# Patient Record
Sex: MALE | Race: BLACK OR AFRICAN AMERICAN | NOT HISPANIC OR LATINO | Employment: FULL TIME | URBAN - METROPOLITAN AREA
[De-identification: names, ages, dates, MRNs, and addresses within clinical notes are randomized per-mention and may not be internally consistent; named-entity substitution may affect disease eponyms.]

---

## 2021-04-16 ENCOUNTER — HOSPITAL ENCOUNTER (OUTPATIENT)
Dept: GENERAL RADIOLOGY | Facility: HOSPITAL | Age: 51
Discharge: HOME | End: 2021-04-16
Attending: FAMILY MEDICINE

## 2021-08-02 ENCOUNTER — TELEPHONE (OUTPATIENT)
Dept: UROLOGY | Facility: CLINIC | Age: 51
End: 2021-08-02

## 2022-01-14 PROCEDURE — U0004 COV-19 TEST NON-CDC HGH THRU: HCPCS | Performed by: EMERGENCY MEDICINE

## 2022-02-02 PROCEDURE — U0004 COV-19 TEST NON-CDC HGH THRU: HCPCS | Performed by: EMERGENCY MEDICINE

## 2023-04-27 ENCOUNTER — OFFICE VISIT (OUTPATIENT)
Dept: FAMILY MEDICINE CLINIC | Facility: CLINIC | Age: 53
End: 2023-04-27
Payer: COMMERCIAL

## 2023-04-27 VITALS
DIASTOLIC BLOOD PRESSURE: 82 MMHG | BODY MASS INDEX: 29.85 KG/M2 | HEIGHT: 67 IN | HEART RATE: 86 BPM | OXYGEN SATURATION: 97 % | SYSTOLIC BLOOD PRESSURE: 124 MMHG | WEIGHT: 190.2 LBS

## 2023-04-27 DIAGNOSIS — Z13.220 LIPID SCREENING: ICD-10-CM

## 2023-04-27 DIAGNOSIS — Z13.29 THYROID DISORDER SCREENING: ICD-10-CM

## 2023-04-27 DIAGNOSIS — Z12.11 SCREEN FOR COLON CANCER: ICD-10-CM

## 2023-04-27 DIAGNOSIS — Z13.1 DIABETES MELLITUS SCREENING: ICD-10-CM

## 2023-04-27 DIAGNOSIS — Z11.59 NEED FOR HEPATITIS C SCREENING TEST: ICD-10-CM

## 2023-04-27 DIAGNOSIS — I10 PRIMARY HYPERTENSION: Primary | ICD-10-CM

## 2023-04-27 LAB
ALBUMIN SERPL-MCNC: 4.5 G/DL (ref 3.5–5.2)
ALBUMIN/GLOB SERPL: 1.7 G/DL
ALP SERPL-CCNC: 64 U/L (ref 39–117)
ALT SERPL W P-5'-P-CCNC: 17 U/L (ref 1–41)
ANION GAP SERPL CALCULATED.3IONS-SCNC: 9 MMOL/L (ref 5–15)
AST SERPL-CCNC: 18 U/L (ref 1–40)
BASOPHILS # BLD AUTO: 0.04 10*3/MM3 (ref 0–0.2)
BASOPHILS NFR BLD AUTO: 0.5 % (ref 0–1.5)
BILIRUB SERPL-MCNC: 0.2 MG/DL (ref 0–1.2)
BUN SERPL-MCNC: 13 MG/DL (ref 6–20)
BUN/CREAT SERPL: 12.9 (ref 7–25)
CALCIUM SPEC-SCNC: 9.4 MG/DL (ref 8.6–10.5)
CHLORIDE SERPL-SCNC: 104 MMOL/L (ref 98–107)
CHOLEST SERPL-MCNC: 203 MG/DL (ref 0–200)
CO2 SERPL-SCNC: 30 MMOL/L (ref 22–29)
CREAT SERPL-MCNC: 1.01 MG/DL (ref 0.76–1.27)
DEPRECATED RDW RBC AUTO: 42.3 FL (ref 37–54)
EGFRCR SERPLBLD CKD-EPI 2021: 89.5 ML/MIN/1.73
EOSINOPHIL # BLD AUTO: 0.2 10*3/MM3 (ref 0–0.4)
EOSINOPHIL NFR BLD AUTO: 2.6 % (ref 0.3–6.2)
ERYTHROCYTE [DISTWIDTH] IN BLOOD BY AUTOMATED COUNT: 13.8 % (ref 12.3–15.4)
GLOBULIN UR ELPH-MCNC: 2.6 GM/DL
GLUCOSE SERPL-MCNC: 90 MG/DL (ref 65–99)
HCT VFR BLD AUTO: 39 % (ref 37.5–51)
HCV AB SER DONR QL: NORMAL
HDLC SERPL QL: 5.08
HDLC SERPL-MCNC: 40 MG/DL (ref 40–60)
HGB BLD-MCNC: 13.2 G/DL (ref 13–17.7)
IMM GRANULOCYTES # BLD AUTO: 0.02 10*3/MM3 (ref 0–0.05)
IMM GRANULOCYTES NFR BLD AUTO: 0.3 % (ref 0–0.5)
LDLC SERPL CALC-MCNC: 100 MG/DL (ref 0–100)
LYMPHOCYTES # BLD AUTO: 3.01 10*3/MM3 (ref 0.7–3.1)
LYMPHOCYTES NFR BLD AUTO: 38.4 % (ref 19.6–45.3)
MCH RBC QN AUTO: 28.9 PG (ref 26.6–33)
MCHC RBC AUTO-ENTMCNC: 33.8 G/DL (ref 31.5–35.7)
MCV RBC AUTO: 85.5 FL (ref 79–97)
MONOCYTES # BLD AUTO: 0.74 10*3/MM3 (ref 0.1–0.9)
MONOCYTES NFR BLD AUTO: 9.4 % (ref 5–12)
NEUTROPHILS NFR BLD AUTO: 3.83 10*3/MM3 (ref 1.7–7)
NEUTROPHILS NFR BLD AUTO: 48.8 % (ref 42.7–76)
NRBC BLD AUTO-RTO: 0.1 /100 WBC (ref 0–0.2)
PLATELET # BLD AUTO: 228 10*3/MM3 (ref 140–450)
PMV BLD AUTO: 12.8 FL (ref 6–12)
POTASSIUM SERPL-SCNC: 4.5 MMOL/L (ref 3.5–5.2)
PROT SERPL-MCNC: 7.1 G/DL (ref 6–8.5)
RBC # BLD AUTO: 4.56 10*6/MM3 (ref 4.14–5.8)
SODIUM SERPL-SCNC: 143 MMOL/L (ref 136–145)
T4 FREE SERPL-MCNC: 1.47 NG/DL (ref 0.93–1.7)
TRIGL SERPL-MCNC: 376 MG/DL (ref 0–150)
TSH SERPL DL<=0.05 MIU/L-ACNC: 1.11 UIU/ML (ref 0.27–4.2)
VLDLC SERPL-MCNC: 63 MG/DL (ref 5–40)
WBC NRBC COR # BLD: 7.84 10*3/MM3 (ref 3.4–10.8)

## 2023-04-27 PROCEDURE — 80061 LIPID PANEL: CPT | Performed by: NURSE PRACTITIONER

## 2023-04-27 PROCEDURE — 84439 ASSAY OF FREE THYROXINE: CPT | Performed by: NURSE PRACTITIONER

## 2023-04-27 PROCEDURE — 86803 HEPATITIS C AB TEST: CPT | Performed by: NURSE PRACTITIONER

## 2023-04-27 PROCEDURE — 80050 GENERAL HEALTH PANEL: CPT | Performed by: NURSE PRACTITIONER

## 2023-04-27 RX ORDER — METOPROLOL TARTRATE 100 MG/1
100 TABLET ORAL DAILY
Qty: 90 TABLET | Refills: 1 | Status: SHIPPED | OUTPATIENT
Start: 2023-04-27

## 2023-04-27 RX ORDER — MELOXICAM 7.5 MG/1
TABLET ORAL
COMMUNITY
Start: 2023-04-21 | End: 2023-04-27

## 2023-04-27 RX ORDER — BUSPIRONE HYDROCHLORIDE 10 MG/1
TABLET ORAL
COMMUNITY
Start: 2023-04-21

## 2023-05-01 ENCOUNTER — PATIENT ROUNDING (BHMG ONLY) (OUTPATIENT)
Dept: FAMILY MEDICINE CLINIC | Facility: CLINIC | Age: 53
End: 2023-05-01
Payer: COMMERCIAL

## 2023-05-26 ENCOUNTER — TELEPHONE (OUTPATIENT)
Dept: FAMILY MEDICINE CLINIC | Facility: CLINIC | Age: 53
End: 2023-05-26
Payer: COMMERCIAL

## 2023-10-20 DIAGNOSIS — I10 PRIMARY HYPERTENSION: ICD-10-CM

## 2023-10-20 RX ORDER — METOPROLOL TARTRATE 100 MG/1
100 TABLET ORAL DAILY
Qty: 90 TABLET | Refills: 1 | Status: SHIPPED | OUTPATIENT
Start: 2023-10-20

## 2024-04-14 DIAGNOSIS — I10 PRIMARY HYPERTENSION: ICD-10-CM

## 2024-04-15 DIAGNOSIS — I10 PRIMARY HYPERTENSION: ICD-10-CM

## 2024-04-15 RX ORDER — METOPROLOL TARTRATE 100 MG/1
100 TABLET ORAL DAILY
Qty: 30 TABLET | Refills: 0 | Status: SHIPPED | OUTPATIENT
Start: 2024-04-15

## 2024-04-15 RX ORDER — METOPROLOL TARTRATE 100 MG/1
100 TABLET ORAL DAILY
Qty: 90 TABLET | OUTPATIENT
Start: 2024-04-15

## 2024-05-16 DIAGNOSIS — I10 PRIMARY HYPERTENSION: ICD-10-CM

## 2024-05-16 RX ORDER — METOPROLOL TARTRATE 100 MG/1
100 TABLET ORAL DAILY
Qty: 30 TABLET | Refills: 0 | Status: SHIPPED | OUTPATIENT
Start: 2024-05-16

## 2024-07-17 DIAGNOSIS — I10 PRIMARY HYPERTENSION: ICD-10-CM

## 2024-07-17 RX ORDER — METOPROLOL TARTRATE 100 MG/1
100 TABLET ORAL DAILY
Qty: 90 TABLET | Refills: 1 | Status: SHIPPED | OUTPATIENT
Start: 2024-07-17

## 2025-01-15 DIAGNOSIS — I10 PRIMARY HYPERTENSION: ICD-10-CM

## 2025-01-15 RX ORDER — METOPROLOL TARTRATE 100 MG/1
100 TABLET ORAL DAILY
Qty: 90 TABLET | Refills: 1 | OUTPATIENT
Start: 2025-01-15

## 2025-01-28 ENCOUNTER — OFFICE VISIT (OUTPATIENT)
Dept: FAMILY MEDICINE CLINIC | Facility: CLINIC | Age: 55
End: 2025-01-28
Payer: COMMERCIAL

## 2025-01-28 VITALS
WEIGHT: 195 LBS | SYSTOLIC BLOOD PRESSURE: 129 MMHG | OXYGEN SATURATION: 98 % | DIASTOLIC BLOOD PRESSURE: 84 MMHG | HEIGHT: 67 IN | HEART RATE: 60 BPM | BODY MASS INDEX: 30.61 KG/M2

## 2025-01-28 DIAGNOSIS — F41.9 ANXIETY: ICD-10-CM

## 2025-01-28 DIAGNOSIS — Z87.891 PERSONAL HISTORY OF TOBACCO USE, PRESENTING HAZARDS TO HEALTH: ICD-10-CM

## 2025-01-28 DIAGNOSIS — Z13.220 LIPID SCREENING: ICD-10-CM

## 2025-01-28 DIAGNOSIS — B37.2 SKIN YEAST INFECTION: ICD-10-CM

## 2025-01-28 DIAGNOSIS — Z13.1 DIABETES MELLITUS SCREENING: ICD-10-CM

## 2025-01-28 DIAGNOSIS — Z13.29 THYROID DISORDER SCREENING: ICD-10-CM

## 2025-01-28 DIAGNOSIS — M70.11 BURSITIS OF RIGHT WRIST: ICD-10-CM

## 2025-01-28 DIAGNOSIS — I10 PRIMARY HYPERTENSION: Primary | ICD-10-CM

## 2025-01-28 LAB
ALBUMIN SERPL-MCNC: 4.2 G/DL (ref 3.5–5.2)
ALBUMIN/GLOB SERPL: 1.4 G/DL
ALP SERPL-CCNC: 67 U/L (ref 39–117)
ALT SERPL W P-5'-P-CCNC: 19 U/L (ref 1–41)
ANION GAP SERPL CALCULATED.3IONS-SCNC: 6 MMOL/L (ref 5–15)
AST SERPL-CCNC: 21 U/L (ref 1–40)
BILIRUB SERPL-MCNC: 0.4 MG/DL (ref 0–1.2)
BUN SERPL-MCNC: 11 MG/DL (ref 6–20)
BUN/CREAT SERPL: 9.3 (ref 7–25)
CALCIUM SPEC-SCNC: 9.4 MG/DL (ref 8.6–10.5)
CHLORIDE SERPL-SCNC: 106 MMOL/L (ref 98–107)
CHOLEST SERPL-MCNC: 220 MG/DL (ref 0–200)
CO2 SERPL-SCNC: 28 MMOL/L (ref 22–29)
CREAT SERPL-MCNC: 1.18 MG/DL (ref 0.76–1.27)
DEPRECATED RDW RBC AUTO: 44.6 FL (ref 37–54)
EGFRCR SERPLBLD CKD-EPI 2021: 73.3 ML/MIN/1.73
ERYTHROCYTE [DISTWIDTH] IN BLOOD BY AUTOMATED COUNT: 13.8 % (ref 12.3–15.4)
GLOBULIN UR ELPH-MCNC: 2.9 GM/DL
GLUCOSE SERPL-MCNC: 98 MG/DL (ref 65–99)
HCT VFR BLD AUTO: 39.8 % (ref 37.5–51)
HDLC SERPL-MCNC: 40 MG/DL (ref 40–60)
HGB BLD-MCNC: 13.3 G/DL (ref 13–17.7)
LDLC SERPL CALC-MCNC: 129 MG/DL (ref 0–100)
LDLC/HDLC SERPL: 3.07 {RATIO}
MCH RBC QN AUTO: 29.4 PG (ref 26.6–33)
MCHC RBC AUTO-ENTMCNC: 33.4 G/DL (ref 31.5–35.7)
MCV RBC AUTO: 87.9 FL (ref 79–97)
PLATELET # BLD AUTO: 237 10*3/MM3 (ref 140–450)
PMV BLD AUTO: 13 FL (ref 6–12)
POTASSIUM SERPL-SCNC: 4.1 MMOL/L (ref 3.5–5.2)
PROT SERPL-MCNC: 7.1 G/DL (ref 6–8.5)
RBC # BLD AUTO: 4.53 10*6/MM3 (ref 4.14–5.8)
SODIUM SERPL-SCNC: 140 MMOL/L (ref 136–145)
T4 FREE SERPL-MCNC: 1.53 NG/DL (ref 0.92–1.68)
TRIGL SERPL-MCNC: 286 MG/DL (ref 0–150)
TSH SERPL DL<=0.05 MIU/L-ACNC: 2.78 UIU/ML (ref 0.27–4.2)
VLDLC SERPL-MCNC: 51 MG/DL (ref 5–40)
WBC NRBC COR # BLD AUTO: 7.02 10*3/MM3 (ref 3.4–10.8)

## 2025-01-28 PROCEDURE — 80050 GENERAL HEALTH PANEL: CPT | Performed by: NURSE PRACTITIONER

## 2025-01-28 PROCEDURE — 99214 OFFICE O/P EST MOD 30 MIN: CPT | Performed by: NURSE PRACTITIONER

## 2025-01-28 PROCEDURE — 20550 NJX 1 TENDON SHEATH/LIGAMENT: CPT | Performed by: NURSE PRACTITIONER

## 2025-01-28 PROCEDURE — 84439 ASSAY OF FREE THYROXINE: CPT | Performed by: NURSE PRACTITIONER

## 2025-01-28 PROCEDURE — 80061 LIPID PANEL: CPT | Performed by: NURSE PRACTITIONER

## 2025-01-28 RX ORDER — TRIAMCINOLONE ACETONIDE 40 MG/ML
40 INJECTION, SUSPENSION INTRA-ARTICULAR; INTRAMUSCULAR
Status: COMPLETED | OUTPATIENT
Start: 2025-01-28 | End: 2025-01-28

## 2025-01-28 RX ORDER — METOPROLOL TARTRATE 100 MG/1
100 TABLET ORAL DAILY
Qty: 90 TABLET | Refills: 3 | Status: SHIPPED | OUTPATIENT
Start: 2025-01-28

## 2025-01-28 RX ORDER — HYDROXYZINE PAMOATE 25 MG/1
25 CAPSULE ORAL 3 TIMES DAILY PRN
Qty: 90 CAPSULE | Refills: 0 | Status: SHIPPED | OUTPATIENT
Start: 2025-01-28

## 2025-01-28 RX ORDER — FLUCONAZOLE 200 MG/1
200 TABLET ORAL DAILY
Qty: 14 TABLET | Refills: 0 | Status: SHIPPED | OUTPATIENT
Start: 2025-01-28

## 2025-01-28 RX ORDER — NYSTATIN 100000 U/G
1 CREAM TOPICAL 2 TIMES DAILY
Qty: 30 G | Refills: 5 | Status: SHIPPED | OUTPATIENT
Start: 2025-01-28

## 2025-01-28 RX ADMIN — TRIAMCINOLONE ACETONIDE 40 MG: 40 INJECTION, SUSPENSION INTRA-ARTICULAR; INTRAMUSCULAR at 11:47

## 2025-01-28 NOTE — PROGRESS NOTES
Chief Complaint  Hypertension    Subjective          Jesse Villela is a 54 y.o. male who presents to [facility]    History of Present Illness    HTN: BP doing good at home. 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    Complain of inner thigh rash bilaterally that itches and red.    Buspar causes him to feel weird and funny.  States he doesn't like the way it makes him feel.      PHQ-2 Total Score: 0   PHQ-9 Total Score:         Review of Systems   Constitutional:  Negative for chills, fatigue and fever.   Respiratory:  Negative for cough and shortness of breath.    Cardiovascular:  Negative for chest pain and palpitations.   Gastrointestinal:  Negative for constipation, diarrhea, nausea and vomiting.   Musculoskeletal:  Positive for joint swelling. Negative for back pain and neck pain.   Skin:  Negative for rash.   Neurological:  Negative for dizziness and headaches.          Medical History: has a past medical history of Avascular necrosis of bone of hip, left (08/15/2016, 6/24/2016), H/O aseptic necrosis of bone (07/10/2014), H/O total hip arthroplasty, right (06/15/2015), Hip pain (06/15/2015), History of arthroplasty of right hip (06/24/2016), Hypertension, and Osteoarthritis of hip (08/17/2015).     Surgical History: has a past surgical history that includes Other surgical history and Total hip arthroplasty (Right, 01/20/2015).     Family History: family history includes Breast cancer in his mother; Cancer in his mother.     Social History: reports that he has been smoking cigarettes. He has a 30 pack-year smoking history. He has never used smokeless tobacco. He reports current alcohol use. He reports that he does not use drugs.    Allergies: Patient has no known allergies.      Health Maintenance Due   Topic Date Due    LUNG CANCER SCREENING  Never done            Current Outpatient Medications:      "metoprolol tartrate (LOPRESSOR) 100 MG tablet, Take 1 tablet by mouth Daily., Disp: 90 tablet, Rfl: 3    fluconazole (Diflucan) 200 MG tablet, Take 1 tablet by mouth Daily., Disp: 14 tablet, Rfl: 0    hydrOXYzine pamoate (VISTARIL) 25 MG capsule, Take 1 capsule by mouth 3 (Three) Times a Day As Needed for Itching or Anxiety., Disp: 90 capsule, Rfl: 0    nystatin (MYCOSTATIN) 354342 UNIT/GM cream, Apply 1 Application topically to the appropriate area as directed 2 (Two) Times a Day., Disp: 30 g, Rfl: 5      Immunization History   Administered Date(s) Administered    COVID-19 (PFIZER) Purple Cap Monovalent 05/05/2021, 05/26/2021, 01/09/2022         Objective       Vitals:    01/28/25 1112   BP: 129/84   Pulse: 60   SpO2: 98%   Weight: 88.5 kg (195 lb)   Height: 170.2 cm (67\")      Body mass index is 30.54 kg/m².   Wt Readings from Last 3 Encounters:   01/28/25 88.5 kg (195 lb)   04/27/23 86.3 kg (190 lb 3.2 oz)   03/14/22 90.7 kg (200 lb)      BP Readings from Last 3 Encounters:   01/28/25 129/84   04/27/23 124/82   03/14/22 149/100        BMI is >= 30 and <35. (Class 1 Obesity). The following options were offered after discussion;: weight loss educational material (shared in after visit summary)       Physical Exam  Vitals reviewed.   Constitutional:       Appearance: Normal appearance.   Cardiovascular:      Rate and Rhythm: Normal rate and regular rhythm.      Pulses: Normal pulses.      Heart sounds: Normal heart sounds.   Pulmonary:      Effort: Pulmonary effort is normal.      Breath sounds: Normal breath sounds.   Musculoskeletal:      Right wrist: Tenderness and bony tenderness present. Decreased range of motion.      Left wrist: Normal.        Arms:    Skin:     General: Skin is warm and dry.   Neurological:      Mental Status: He is alert and oriented to person, place, and time.   Psychiatric:         Mood and Affect: Mood normal.         Behavior: Behavior normal.         Thought Content: Thought content " normal.         Judgment: Judgment normal.             Result Review :               - Injection Tendon or Ligament    Date/Time: 1/28/2025 11:47 AM    Performed by: Sade Scott APRN  Authorized by: Sade Scott APRN  Local anesthesia used: no    Anesthesia:  Local anesthesia used: no    Sedation:  Patient sedated: no    Patient tolerance: patient tolerated the procedure well with no immediate complications  Medications administered: 40 mg triamcinolone acetonide 40 MG/ML                Assessment and Plan        Diagnoses and all orders for this visit:    1. Primary hypertension (Primary)  Comments:  Managed on toprol XL  Orders:  -     metoprolol tartrate (LOPRESSOR) 100 MG tablet; Take 1 tablet by mouth Daily.  Dispense: 90 tablet; Refill: 3    2. Skin yeast infection  -     fluconazole (Diflucan) 200 MG tablet; Take 1 tablet by mouth Daily.  Dispense: 14 tablet; Refill: 0  -     nystatin (MYCOSTATIN) 255089 UNIT/GM cream; Apply 1 Application topically to the appropriate area as directed 2 (Two) Times a Day.  Dispense: 30 g; Refill: 5    3. Bursitis of right wrist  -     - Injection Tendon or Ligament    4. Personal history of tobacco use, presenting hazards to health  -     CT chest low dose wo; Future    5. Diabetes mellitus screening  -     Comprehensive Metabolic Panel    6. Lipid screening  -     CBC (No Diff)  -     Lipid Panel    7. Thyroid disorder screening  -     TSH  -     T4, free    8. Anxiety  Comments:  Stop Buspar and start Hydroxyzine 25 mg prn anxiety  Orders:  -     hydrOXYzine pamoate (VISTARIL) 25 MG capsule; Take 1 capsule by mouth 3 (Three) Times a Day As Needed for Itching or Anxiety.  Dispense: 90 capsule; Refill: 0    Other orders  -     Cancel: Inject Trigger Points, > 3      Post Injection Instructions    Following an injection of the joint or trigger point injection there are a few things to be aware of.   Cortisone/Steroid Injections  If you received a cortisone  injection you may experience a cortisol flare. This means you may be sore/achy in the injected area the night of the injection or in the days following the injection. This pain can be worse than your initial pain prior to the injection. IF this occurs, it will usually subside within 24-72 hours. In some cases it can last 1-2 weeks although this happens much less frequently.   If you develop the cortisol flare you should elevate the affected area, use an ace wrap if possible, ice the area and take ibuprofen or tylenol (if you are able to).   There may be times you do not develop a cortisol flare and other times that you might develop a cortisol flare. Unfortunately, there is no way to tell if you will develop or why you may have developed it with one injection but not with another.   You may also experience swelling or stiffness of the joint following the injection.  As a general guideline, you may receive a steroid injection every 3 months, with a maximum of 2 joints per visit.   The injection can last anywhere from 4-12 weeks. In some cases the injections do not last this long and in other cases it may last longer.          Follow Up     Return in 1 year (on 1/28/2026).    Patient was given instructions and counseling regarding his condition or for health maintenance advice. Please see specific information pulled into the AVS if appropriate. Patient understands the importance of having any ordered tests to be performed in a timely fashion.      I spent 25 minutes caring for Jesse on this date of service. This time includes time spent by me in the following activities: preparing for the visit, reviewing tests, performing a medically appropriate examination and/or evaluation, counseling and educating the patient/family/caregiver, referring and communicating with other health care professionals, documenting information in the medical record, independently interpreting results and communicating that information with  "the patient/family/caregiver, care coordination, ordering medications, and ordering test(s)      Sade Martinez TOMEKA Scott     Low-Dose Lung Cancer CT Screening Visit    CHIEF COMPLAINT:    Shared Decision Making  I am discussing tobacco cessation today with Jesse Villela    SMOKING HISTORY:     Social History     Tobacco Use   Smoking Status Every Day    Current packs/day: 1.00    Average packs/day: 1 pack/day for 30.0 years (30.0 ttl pk-yrs)    Types: Cigarettes   Smokeless Tobacco Never       SUBJECTIVE:     Jesse Villela is a  smoker with a  35  pack year history.  he reports no use of alternate forms of tobacco, electronic cigarettes, marijuana or other substances.  Based on the recommendation of the [place] Preventive Services Task Force, this patient is at high risk for lung cancer and a low-dose CT screening scan is recommended.     The patient has had no hemoptysis, unintentional weight loss or increasing shortness of breath. The patient is asymptomatic and has no signs or symptoms of lung cancer.     Together we discussed the potential benefits and potential harms of being screened for lung cancer including the potential for follow up diagnostic testing, risk for over diagnosis, false positive rate and radiation exposure using the [facility] Lung Cancer Screening Shared Decision-Making Tool. A copy of this decision aid resource has been provided in the after visit summary.  We also reviewed the patient's smoking history and counseled him on the importance and health benefits of maintaining cigarette smoking abstinence.      OBJECTIVE:    /84   Pulse 60   Ht 170.2 cm (67\")   Wt 88.5 kg (195 lb)   SpO2 98%   BMI 30.54 kg/m²   General: no distress, alert and oriented  Chest: Lung sounds are clear to auscultation, no wheezes, rales or rhonchi, with symmetric air entry. No respiratory distress  Cardiovascular: RRR with no murmur auscultated      Continued Smoking Abstinence discussion:     We " discussed that there are a number of resources and interventions to assist with smoking cessation if needed in the future.   On a scale of zero to ten, the patient rates their motivation to stay quit at a 2 out of 10 today.  The patient is confident that they will never smoke in the future.    Recommendations for continued lung cancer screening:      We discussed the NCCN guidelines for lung cancer screening and the patient verbalized understanding that annual screening is recommended until fifteen years beyond smoking as long as they have no other disease or comorbidity that would prevent them from receiving cancer treatments such as surgery should a lung cancer be detected.  After review of the NCCN guidelines and recommendations for ongoing screening, the patient verbalized understanding of recommendations for follow-up.  The patient has decided to proceed with a Low Dose Lung Cancer Screening CT today.       10minutes face-to-face spent counseling patient on the continued health benefits of having quit tobacco, maintaining smoking abstinence, smoking cessation strategies and resources, as well as the importance of adherence to annual lung cancer low-dose CT screening.

## 2025-02-14 ENCOUNTER — OFFICE VISIT (OUTPATIENT)
Dept: FAMILY MEDICINE CLINIC | Facility: CLINIC | Age: 55
End: 2025-02-14
Payer: COMMERCIAL

## 2025-02-14 VITALS
BODY MASS INDEX: 30.29 KG/M2 | HEART RATE: 61 BPM | SYSTOLIC BLOOD PRESSURE: 121 MMHG | DIASTOLIC BLOOD PRESSURE: 74 MMHG | WEIGHT: 193 LBS | HEIGHT: 67 IN | OXYGEN SATURATION: 98 %

## 2025-02-14 DIAGNOSIS — B07.0 PLANTAR WART OF RIGHT FOOT: ICD-10-CM

## 2025-02-14 DIAGNOSIS — H11.433 CONJUNCTIVAL INJECTION, BILATERAL: Primary | ICD-10-CM

## 2025-02-14 RX ORDER — OLOPATADINE HYDROCHLORIDE 1 MG/ML
1 SOLUTION/ DROPS OPHTHALMIC 2 TIMES DAILY
Qty: 5 ML | Refills: 2 | Status: SHIPPED | OUTPATIENT
Start: 2025-02-14

## 2025-02-14 NOTE — PROGRESS NOTES
"Chief Complaint  Eye Problem (Bilateral Red, swollen, itch a little, ongoing 4days) and Foot Problem (Knot on bottom of right foot, painful at times, ongoing for months)    Subjective      Jesse Villela is a 54 y.o. male who presents to [facility]     History of Present Illness  The patient presents for evaluation of red eyes and plantar wart.    He has been experiencing persistent redness in his eyes since Tuesday, accompanied by intermittent itching. He reports no associated pain or swelling. This morning, he experienced a headache upon awakening. He also noted that his eyes appeared as if they were filled with sand when he wore his work glasses recently. His eyes tend to redden after showering but clear up subsequently. He reports no exposure to any chemicals at his workplace or home.. His symptoms have remained consistent over the past 4 days, with slight improvement.    He reports a painful knot on his foot, causing significant discomfort. He is unsure if it is related to the steel-toe shoes he wears for work.       Patient Care Team:  Sade Scott APRN as PCP - General (Nurse Practitioner)        Review of Systems  Per HPI    Objective   Vital Signs:   Vitals:    02/14/25 1158   BP: 121/74   BP Location: Left arm   Patient Position: Sitting   Cuff Size: Adult   Pulse: 61   TempSrc: Temporal   SpO2: 98%   Weight: 87.5 kg (193 lb)   Height: 170.2 cm (67.01\")     Body mass index is 30.22 kg/m².    Wt Readings from Last 3 Encounters:   02/14/25 87.5 kg (193 lb)   01/28/25 88.5 kg (195 lb)   04/27/23 86.3 kg (190 lb 3.2 oz)     BP Readings from Last 3 Encounters:   02/14/25 121/74   01/28/25 129/84   04/27/23 124/82       Health Maintenance   Topic Date Due    Pneumococcal Vaccine 50+ (1 of 2 - PCV) Never done    ZOSTER VACCINE (1 of 2) Never done    LUNG CANCER SCREENING  Never done    COVID-19 Vaccine (4 - 2024-25 season) 09/01/2024    INFLUENZA VACCINE  03/31/2025 " (Originally 7/1/2024)    TDAP/TD VACCINES (1 - Tdap) 01/28/2026 (Originally 10/16/1989)    ANNUAL PHYSICAL  01/28/2026    BMI FOLLOWUP  01/28/2026    COLORECTAL CANCER SCREENING  05/13/2026    HEPATITIS C SCREENING  Completed       Physical Exam  Constitutional:       Appearance: Normal appearance.   HENT:      Head: Normocephalic and atraumatic.   Eyes:      Comments: Lateral conjunctival injection, without any discharge or lesions.  Pupils reactive and normal   Cardiovascular:      Rate and Rhythm: Normal rate and regular rhythm.      Pulses: Normal pulses.      Heart sounds: Normal heart sounds.   Pulmonary:      Effort: Pulmonary effort is normal.      Breath sounds: Normal breath sounds.   Neurological:      General: No focal deficit present.      Mental Status: He is alert and oriented to person, place, and time.   Psychiatric:         Mood and Affect: Mood normal.         Behavior: Behavior normal.         Physical Exam         Result Review   The following data was reviewed by: Nelida Holland MD on 02/14/2025:  [x]  Tests & Results  []  Hospitalization/Emergency Department/Urgent Care  []  Internal/External Consultant Notes      Results  Laboratory Studies  Liver function tests are normal.       ASSESSMENT/PLAN  Diagnoses and all orders for this visit:    1. Conjunctival injection, bilateral (Primary)  Comments:  Appears to be allergic versus viral conjunctivitis, slightly improved per patient. prescribed Patanol drops for 7 days.  Return precautions discussed  Orders:  -     olopatadine (PATANOL) 0.1 % ophthalmic solution; Administer 1 drop to both eyes 2 (Two) Times a Day.  Dispense: 5 mL; Refill: 2    2. Plantar wart of right foot  Comments:  Recommended application plantar wart tape with salicylic acid      Assessment & Plan  1. Bilateral ocular erythema.  The condition appears to be an allergic response, potentially indicative of viral or allergic conjunctivitis. There is no evidence suggesting a  bacterial infection, thus negating the need for antibiotic drops. Liver function tests conducted a few days ago were within normal limits. A prescription for Patanol eye drops has been provided, with instructions to apply them daily in both eyes. If symptoms worsen or if there is any blurry vision, changes in vision, headaches, or pain behind the eyes, an immediate visit to the emergency room is advised for further evaluation.    2. Plantar wart.  Advised to use a over-the-counter plantar wart duct tape consistently. The wart remover should contain salicylic acid. After overnight application, the area should be filed down to facilitate easy removal. If the wart does not resolve within a month, a procedure involving scraping and freezing with liquid nitrogen will be considered in the clinic.          Jesse Villela  reports that he has been smoking cigarettes. He has a 30 pack-year smoking history. He has never used smokeless tobacco.    FOLLOW UP  Return if symptoms worsen or fail to improve.  Patient was given instructions and counseling regarding his condition or for health maintenance advice. Please see specific information pulled into the AVS if appropriate.       Nelida Holland MD  02/14/25  12:31 EST    Patient or patient representative verbalized consent for the use of Ambient Listening during the visit with  Nelida Holland MD for chart documentation. 2/14/2025  12:31 EST

## 2025-06-13 ENCOUNTER — OFFICE VISIT (OUTPATIENT)
Dept: FAMILY MEDICINE CLINIC | Facility: CLINIC | Age: 55
End: 2025-06-13
Payer: COMMERCIAL

## 2025-06-13 VITALS
DIASTOLIC BLOOD PRESSURE: 68 MMHG | WEIGHT: 190.8 LBS | HEIGHT: 67 IN | TEMPERATURE: 98.3 F | BODY MASS INDEX: 29.95 KG/M2 | HEART RATE: 67 BPM | SYSTOLIC BLOOD PRESSURE: 124 MMHG | OXYGEN SATURATION: 98 %

## 2025-06-13 DIAGNOSIS — E78.2 MIXED HYPERLIPIDEMIA: ICD-10-CM

## 2025-06-13 DIAGNOSIS — I10 PRIMARY HYPERTENSION: ICD-10-CM

## 2025-06-13 DIAGNOSIS — G47.33 OBSTRUCTIVE SLEEP APNEA SYNDROME: Primary | ICD-10-CM

## 2025-06-13 DIAGNOSIS — F17.218 CIGARETTE NICOTINE DEPENDENCE WITH OTHER NICOTINE-INDUCED DISORDER: ICD-10-CM

## 2025-06-13 DIAGNOSIS — R00.0 SINUS TACHYCARDIA: ICD-10-CM

## 2025-06-13 RX ORDER — NICOTINE 21 MG/24HR
1 PATCH, TRANSDERMAL 24 HOURS TRANSDERMAL EVERY 24 HOURS
Qty: 60 PATCH | Refills: 0 | Status: SHIPPED | OUTPATIENT
Start: 2025-06-13

## 2025-06-13 RX ORDER — METOPROLOL SUCCINATE 50 MG/1
50 TABLET, EXTENDED RELEASE ORAL DAILY
Qty: 90 TABLET | Refills: 1 | Status: SHIPPED | OUTPATIENT
Start: 2025-06-13

## 2025-06-13 RX ORDER — ROSUVASTATIN CALCIUM 20 MG/1
20 TABLET, COATED ORAL DAILY
Qty: 90 TABLET | Refills: 1 | Status: SHIPPED | OUTPATIENT
Start: 2025-06-13

## 2025-06-13 NOTE — PROGRESS NOTES
"Chief Complaint  Insomnia (Stops breathing in sleep, snoring ) and Establish Care    Subjective      Jesse Villela is a 54 y.o. male who presents to [facility]     History of Present Illness  The patient presents for evaluation of sleep apnea, hyperlipidemia, and smoking cessation.    He has been utilizing a CPAP machine for over a decade, which he reports as beneficial for his sleep quality. However, he is currently without a mask and is uncertain about the process of obtaining a replacement or a new machine. He experienced an episode of apnea last week, during which he ceased breathing during sleep and was awakened.     He acknowledges that his dietary habits have not been conducive to maintaining optimal cholesterol levels. He expresses concern about the potential impact of coffee consumption on his cholesterol levels.    He reports smoking approximately half a pack of cigarettes daily. He has previously attempted to quit smoking abruptly but found the experience extremely challenging.    SOCIAL HISTORY  He smokes about 10 cigarettes a day.     The 10-year ASCVD risk score (Ava DK, et al., 2019) is: 18.1%    Values used to calculate the score:      Age: 54 years      Sex: Male      Is Non- : Yes      Diabetic: No      Tobacco smoker: Yes      Systolic Blood Pressure: 124 mmHg      Is BP treated: Yes      HDL Cholesterol: 40 mg/dL      Total Cholesterol: 220 mg/dL   Patient Care Team:  Nelida Holland MD as PCP - General (Family Medicine)    Review of Systems      Objective   Vital Signs:   Vitals:    06/13/25 1108   BP: 124/68   BP Location: Left arm   Patient Position: Sitting   Cuff Size: Adult   Pulse: 67   Temp: 98.3 °F (36.8 °C)   TempSrc: Temporal   SpO2: 98%   Weight: 86.5 kg (190 lb 12.8 oz)   Height: 170.2 cm (67.01\")     Body mass index is 29.88 kg/m².    Wt Readings from Last 3 Encounters:   06/13/25 86.5 kg (190 lb 12.8 oz)   03/04/25 86.6 " kg (191 lb)   02/14/25 87.5 kg (193 lb)     BP Readings from Last 3 Encounters:   06/13/25 124/68   03/04/25 137/76   02/14/25 121/74       Health Maintenance   Topic Date Due    LUNG CANCER SCREENING  Never done    COVID-19 Vaccine (4 - 2024-25 season) 06/27/2025 (Originally 9/1/2024)    ZOSTER VACCINE (1 of 2) 06/27/2025 (Originally 10/16/2020)    Pneumococcal Vaccine 50+ (1 of 2 - PCV) 12/10/2025 (Originally 10/16/1989)    TDAP/TD VACCINES (1 - Tdap) 01/28/2026 (Originally 10/16/1989)    INFLUENZA VACCINE  07/01/2025    ANNUAL PHYSICAL  01/28/2026    LIPID PANEL  01/28/2026    COLORECTAL CANCER SCREENING  05/13/2026    HEPATITIS C SCREENING  Completed       Physical Exam  Constitutional:       Appearance: Normal appearance.   HENT:      Head: Normocephalic and atraumatic.   Cardiovascular:      Rate and Rhythm: Normal rate and regular rhythm.      Pulses: Normal pulses.      Heart sounds: Normal heart sounds.   Pulmonary:      Effort: Pulmonary effort is normal.      Breath sounds: Normal breath sounds.   Neurological:      General: No focal deficit present.      Mental Status: He is alert and oriented to person, place, and time.   Psychiatric:         Mood and Affect: Mood normal.         Behavior: Behavior normal.         Physical Exam  Cardiovascular: Heart rate is 69.  Skin: Classic eczema presentation       Result Review   The following data was reviewed by: Nelida Holland MD on 06/13/2025:  [x]  Tests & Results  []  Hospitalization/Emergency Department/Urgent Care  []  Internal/External Consultant Notes      Results  Labs   - Cholesterol levels: High   - Thyroid levels: Normal   - Hemoglobin levels: Normal       ASSESSMENT/PLAN  Diagnoses and all orders for this visit:    1. Obstructive sleep apnea syndrome (Primary)  -     Ambulatory Referral to Sleep Medicine    2. Primary hypertension  -     metoprolol succinate XL (TOPROL-XL) 50 MG 24 hr tablet; Take 1 tablet by mouth Daily.  Dispense: 90 tablet;  Refill: 1    3. Sinus tachycardia  -     metoprolol succinate XL (TOPROL-XL) 50 MG 24 hr tablet; Take 1 tablet by mouth Daily.  Dispense: 90 tablet; Refill: 1    4. Mixed hyperlipidemia  -     rosuvastatin (Crestor) 20 MG tablet; Take 1 tablet by mouth Daily.  Dispense: 90 tablet; Refill: 1    5. Cigarette nicotine dependence with other nicotine-induced disorder  -      CT Chest Low Dose Cancer Screening WO; Future  -     nicotine (Nicoderm CQ) 21 MG/24HR patch; Place 1 patch on the skin as directed by provider Daily.  Dispense: 60 patch; Refill: 0        Assessment & Plan  1. Sleep Apnea.  - Referral to a sleep specialist has been initiated.  - Advised to work on dietary modifications, daily exercise and weight loss.  - Advised to bring current equipment to the appointment for potential titration and resistance testing.    2. Hyperlipidemia.  - Cholesterol levels are elevated, and cardiovascular risk score is significantly high at 18.1.  - Advise dietary modifications, daily exercise and weight loss  - Prescription for cholesterol-lowering medication has been provided.    3. Smoking Cessation.  - Counseled on the importance of smoking cessation and the potential health benefits associated with it.  - Prescription for nicotine patches has been provided    4. Medication Management.  -Blood pressure is 124/68 with heart rate of 69.  No known history of cardiovascular disease or erythematous  - Currently taking metoprolol tartrate 100 mg.  - Plan to switch to Toprol XL 50 mg (24-hour tablet), with further monitoring of blood pressure and heart rate    Follow-up  The patient will follow up in 3 months.       Jesse Villela  reports that he has been smoking cigarettes. He has a 30 pack-year smoking history. He has never used smokeless tobacco. I have educated him on the risk of diseases from using tobacco products such as cancer, COPD, and heart disease.     I advised him to quit and he is willing to quit. We have  discussed the following method/s for tobacco cessation:  Counseling.  Together we have set a quit date for 1 month from today.  He will follow up with me in 1 month or sooner to check on his progress.    I spent 3  minutes counseling the patient.          FOLLOW UP  Return in about 3 months (around 9/13/2025).  Patient was given instructions and counseling regarding his condition or for health maintenance advice. Please see specific information pulled into the AVS if appropriate.       Neilda Holland MD  06/13/25  12:35 EDT    Patient or patient representative verbalized consent for the use of Ambient Listening during the visit with  Nelida Holland MD for chart documentation. 6/13/2025  11:49 EDT

## 2025-07-25 ENCOUNTER — OFFICE VISIT (OUTPATIENT)
Dept: SLEEP MEDICINE | Facility: HOSPITAL | Age: 55
End: 2025-07-25
Payer: COMMERCIAL

## 2025-07-25 VITALS
HEIGHT: 67 IN | OXYGEN SATURATION: 97 % | BODY MASS INDEX: 28.39 KG/M2 | WEIGHT: 180.9 LBS | DIASTOLIC BLOOD PRESSURE: 76 MMHG | SYSTOLIC BLOOD PRESSURE: 131 MMHG | HEART RATE: 58 BPM

## 2025-07-25 DIAGNOSIS — G47.33 OSA (OBSTRUCTIVE SLEEP APNEA): Primary | ICD-10-CM

## 2025-07-25 DIAGNOSIS — R06.81 WITNESSED EPISODE OF APNEA: ICD-10-CM

## 2025-07-25 DIAGNOSIS — E66.3 OVERWEIGHT (BMI 25.0-29.9): ICD-10-CM

## 2025-07-25 DIAGNOSIS — I10 HYPERTENSION, UNSPECIFIED TYPE: ICD-10-CM

## 2025-07-25 PROCEDURE — G0463 HOSPITAL OUTPT CLINIC VISIT: HCPCS

## 2025-07-25 NOTE — PROGRESS NOTES
[facility]   [address]   [place] [id number] [phone]     Referring physician/provider: Nelida Holland MD   PCP: Nelida Holland MD    Type of service: Initial Sleep Medicine Consult.  Date of service: 7/25/2025        Sleep Clinic Initial Consult Visit      Patient or patient representative verbalized consent for the use of Ambient Listening during the visit with  Jules Solares DO for chart documentation. 7/25/2025  10:48 EDT    HISTORY OF PRESENT ILLNESS  Chief Complaint: CHAO  Jesse Villela is a 54 y.o. male that was seen today, on 7/25/2025 at [facility].  History of Present Illness  He has history of obstructive sleep apnea diagnosed around 9 years ago.  He received his CPAP machine and was on CPAP until a few months ago.  He needs new CPAP supplies and his CPAP machine is old.   He reports experiencing choking episodes during sleep.  He has loud snoring, he has witnessed apneas.  He has nonrestorative sleep and fatigue.  He still has the CPAP machine but needs a new mask as the current one is worn out. His work schedule is from 3:30 PM to 2 AM, and he typically sleeps from 4:30 AM to 12 PM, maintaining this routine even on his days off.  He wakes up 0-1 times per night.  It takes him about 20 to 25 minutes to fall asleep initially.  He does not feel excessively sleepy during the day and usually wakes up once during the night.  He smokes about half a pack of cigarettes daily, and drinks alcohol twice a week. He also drinks coffee, consuming about 3 cups per night while at work. He recalls feeling more rested when he was consistently using the CPAP machine.    Medical history  Hyperlipidemia  Obstructive sleep apnea  Anxiety  He has high blood pressure and is currently taking metoprolol.    SOCIAL HISTORY  The patient smokes half a pack a day. The patient consumes alcohol, approximately two drinks per week.    History  "of Sleep Study before: Yes see HPI, do not have records at this time of sleep study   ESS today: 2  Occupation:     Symptoms:   Have you ever awakened gasping for breath, coughing, choking:  [x]   Yes     []   No   Witnessed apneas: [x]   Yes     []   No   Loud Snoring: [x]   Yes     []   No   Do you drive a commercial vehicle:  []   Yes     [x]   No   History of any near accidents while driving due to sleepiness in the past 5 years: []   Yes     [x]   No     Family hx(parents and siblings) (pertaining to sleep medicine)  There is no family history of CHAO.    ROS:    Negative for:  Symptoms consistent with the clinical diagnosis of Restless legs syndrome  Symptoms consistent with the clinical diagnosis of Cataplexy   Sleep walking   See scanned media document for other sleep related questions      Allergies: Patient has no known allergies.       Objective   Vital Signs:   Vitals:    07/25/25 0900   BP: 131/76   Pulse: 58   SpO2: 97%   Weight: 82.1 kg (180 lb 14.4 oz)   Height: 170.2 cm (67.01\")     Body mass index is 28.32 kg/m².      PHYSICAL EXAM  CONSTITUTIONAL:  Non-toxic, In no overt distress   ENT: Mallampati class 3  NECK:Neck Circumference: 15 inches  RESPIRATORY SYSTEM: Breathing appears nonlabored, no wheezes or rales   CARDIOVASULAR SYSTEM: Regular rate  and rhythm, no murmurs  NEUROLOGICAL SYSTEM: answers questions appropriately      Result Review   The following data was reviewed by: Jules Solares DO on 07/25/2025:  [x]  Medications reviewed      ASSESSMENT/PLAN  Diagnoses and all orders for this visit:    1. CHAO (obstructive sleep apnea) (Primary)  -     Home Sleep Study; Future    2. Witnessed episode of apnea  -     Home Sleep Study; Future    3. Overweight (BMI 25.0-29.9)    4. Hypertension, unspecified type    He presents for management of CHAO. Last testing 9 or more years ago. He needs new cpap machine and supplies.  He has HTN. He is having loud snoring, witnessed apneas, and " episodes of waking up gasping and nonrestorative sleep, fatigue .     HSAT ordered to check for CHAO/severity. He is agreeable to start back on CPAP.    Overweight, patient's BMI is Body mass index is 28.32 kg/m².. I have discussed the relationship between weight and sleep apnea.There is direct correlation between weight and severity of sleep apnea.      I have also discussed with the patient the following  Avoid smoking within at least a few hours before bedtime.  Untreated CHAO is associated with increased risks of stroke, heart attack, heart failure, motor vehicle accidents.   Recommended no driving or operating machinery if feeling sleepy. Discussed options of taking naps and getting rides with other people.   Generally most people need about 7 to 9 hours of sleep per night.       FOLLOW UP  Return for 31 to 90 days after PAP setup.  Patient was given instructions and counseling regarding his condition or for health maintenance advice. Please see specific information pulled into the AVS if appropriate.         Patient's questions were answered.  Thank you for allowing me to participate in the care of this patient.  Dictated Utilizing Dragon Dictation. Please note that portions of this note were completed with a voice recognition program. Part of this note may be an electronic transcription/translation of spoken language to printed text using the Dragon Dictation System.      [facility]   Jules Solares DO  07/25/25  10:47 EDT

## 2025-08-07 ENCOUNTER — TELEPHONE (OUTPATIENT)
Dept: FAMILY MEDICINE CLINIC | Facility: CLINIC | Age: 55
End: 2025-08-07
Payer: COMMERCIAL

## 2025-08-07 DIAGNOSIS — I10 PRIMARY HYPERTENSION: Primary | ICD-10-CM

## 2025-08-07 RX ORDER — METOPROLOL TARTRATE 100 MG/1
100 TABLET ORAL DAILY
Qty: 90 TABLET | Refills: 0 | Status: SHIPPED | OUTPATIENT
Start: 2025-08-07

## 2025-08-08 ENCOUNTER — HOSPITAL ENCOUNTER (OUTPATIENT)
Dept: SLEEP MEDICINE | Facility: HOSPITAL | Age: 55
Discharge: HOME | End: 2025-08-08
Admitting: STUDENT IN AN ORGANIZED HEALTH CARE EDUCATION/TRAINING PROGRAM
Payer: COMMERCIAL

## 2025-08-08 DIAGNOSIS — G47.33 OSA (OBSTRUCTIVE SLEEP APNEA): ICD-10-CM

## 2025-08-08 DIAGNOSIS — R06.81 WITNESSED EPISODE OF APNEA: ICD-10-CM

## 2025-08-08 PROCEDURE — G0399 HOME SLEEP TEST/TYPE 3 PORTA: HCPCS
